# Patient Record
Sex: FEMALE | Race: WHITE | NOT HISPANIC OR LATINO | Employment: FULL TIME | URBAN - METROPOLITAN AREA
[De-identification: names, ages, dates, MRNs, and addresses within clinical notes are randomized per-mention and may not be internally consistent; named-entity substitution may affect disease eponyms.]

---

## 2021-03-26 ENCOUNTER — TELEPHONE (OUTPATIENT)
Dept: ADDICTION MEDICINE | Facility: CLINIC | Age: 61
End: 2021-03-26

## 2021-03-26 ENCOUNTER — OFFICE VISIT (OUTPATIENT)
Dept: INTERNAL MEDICINE | Facility: CLINIC | Age: 61
End: 2021-03-26
Payer: COMMERCIAL

## 2021-03-26 VITALS
HEIGHT: 63 IN | TEMPERATURE: 98.2 F | HEART RATE: 80 BPM | WEIGHT: 143 LBS | RESPIRATION RATE: 12 BRPM | DIASTOLIC BLOOD PRESSURE: 82 MMHG | OXYGEN SATURATION: 97 % | BODY MASS INDEX: 25.34 KG/M2 | SYSTOLIC BLOOD PRESSURE: 124 MMHG

## 2021-03-26 DIAGNOSIS — R71.8 ELEVATED MCV: ICD-10-CM

## 2021-03-26 DIAGNOSIS — Z78.9 ALCOHOL USE: ICD-10-CM

## 2021-03-26 DIAGNOSIS — Z12.83 SKIN CANCER SCREENING: ICD-10-CM

## 2021-03-26 DIAGNOSIS — F41.1 GAD (GENERALIZED ANXIETY DISORDER): ICD-10-CM

## 2021-03-26 DIAGNOSIS — R14.0 ABDOMINAL DISTENSION: ICD-10-CM

## 2021-03-26 DIAGNOSIS — D22.9 ATYPICAL MOLE: ICD-10-CM

## 2021-03-26 DIAGNOSIS — F32.0 MILD MAJOR DEPRESSION (H): ICD-10-CM

## 2021-03-26 DIAGNOSIS — N89.8 VAGINAL MASS: ICD-10-CM

## 2021-03-26 DIAGNOSIS — Z00.00 ROUTINE GENERAL MEDICAL EXAMINATION AT A HEALTH CARE FACILITY: Primary | ICD-10-CM

## 2021-03-26 LAB
ALBUMIN SERPL-MCNC: 3.7 G/DL (ref 3.4–5)
ALP SERPL-CCNC: 61 U/L (ref 40–150)
ALT SERPL W P-5'-P-CCNC: 27 U/L (ref 0–50)
ANION GAP SERPL CALCULATED.3IONS-SCNC: 7 MMOL/L (ref 3–14)
AST SERPL W P-5'-P-CCNC: 17 U/L (ref 0–45)
BASOPHILS # BLD AUTO: 0 10E9/L (ref 0–0.2)
BASOPHILS NFR BLD AUTO: 0.8 %
BILIRUB SERPL-MCNC: 0.3 MG/DL (ref 0.2–1.3)
BUN SERPL-MCNC: 14 MG/DL (ref 7–30)
CALCIUM SERPL-MCNC: 9.4 MG/DL (ref 8.5–10.1)
CHLORIDE SERPL-SCNC: 107 MMOL/L (ref 94–109)
CHOLEST SERPL-MCNC: 234 MG/DL
CO2 SERPL-SCNC: 26 MMOL/L (ref 20–32)
CREAT SERPL-MCNC: 0.68 MG/DL (ref 0.52–1.04)
DIFFERENTIAL METHOD BLD: ABNORMAL
EOSINOPHIL # BLD AUTO: 0.1 10E9/L (ref 0–0.7)
EOSINOPHIL NFR BLD AUTO: 2.4 %
ERYTHROCYTE [DISTWIDTH] IN BLOOD BY AUTOMATED COUNT: 12.4 % (ref 10–15)
GFR SERPL CREATININE-BSD FRML MDRD: >90 ML/MIN/{1.73_M2}
GLUCOSE SERPL-MCNC: 101 MG/DL (ref 70–99)
HCT VFR BLD AUTO: 42.9 % (ref 35–47)
HDLC SERPL-MCNC: 67 MG/DL
HGB BLD-MCNC: 13.9 G/DL (ref 11.7–15.7)
LDLC SERPL CALC-MCNC: 144 MG/DL
LYMPHOCYTES # BLD AUTO: 0.9 10E9/L (ref 0.8–5.3)
LYMPHOCYTES NFR BLD AUTO: 18.3 %
MCH RBC QN AUTO: 34.2 PG (ref 26.5–33)
MCHC RBC AUTO-ENTMCNC: 32.4 G/DL (ref 31.5–36.5)
MCV RBC AUTO: 105 FL (ref 78–100)
MONOCYTES # BLD AUTO: 0.6 10E9/L (ref 0–1.3)
MONOCYTES NFR BLD AUTO: 11.1 %
NEUTROPHILS # BLD AUTO: 3.4 10E9/L (ref 1.6–8.3)
NEUTROPHILS NFR BLD AUTO: 67.4 %
NONHDLC SERPL-MCNC: 167 MG/DL
PLATELET # BLD AUTO: 279 10E9/L (ref 150–450)
POTASSIUM SERPL-SCNC: 4.4 MMOL/L (ref 3.4–5.3)
PROT SERPL-MCNC: 7.9 G/DL (ref 6.8–8.8)
RBC # BLD AUTO: 4.07 10E12/L (ref 3.8–5.2)
SODIUM SERPL-SCNC: 140 MMOL/L (ref 133–144)
TRIGL SERPL-MCNC: 114 MG/DL
TSH SERPL DL<=0.005 MIU/L-ACNC: 0.79 MU/L (ref 0.4–4)
WBC # BLD AUTO: 5 10E9/L (ref 4–11)

## 2021-03-26 PROCEDURE — 99386 PREV VISIT NEW AGE 40-64: CPT | Performed by: INTERNAL MEDICINE

## 2021-03-26 PROCEDURE — 80050 GENERAL HEALTH PANEL: CPT | Performed by: INTERNAL MEDICINE

## 2021-03-26 PROCEDURE — 80061 LIPID PANEL: CPT | Performed by: INTERNAL MEDICINE

## 2021-03-26 PROCEDURE — 99214 OFFICE O/P EST MOD 30 MIN: CPT | Mod: 25 | Performed by: INTERNAL MEDICINE

## 2021-03-26 PROCEDURE — 96127 BRIEF EMOTIONAL/BEHAV ASSMT: CPT | Mod: 59 | Performed by: INTERNAL MEDICINE

## 2021-03-26 PROCEDURE — 36415 COLL VENOUS BLD VENIPUNCTURE: CPT | Performed by: INTERNAL MEDICINE

## 2021-03-26 PROCEDURE — G0145 SCR C/V CYTO,THINLAYER,RESCR: HCPCS | Performed by: INTERNAL MEDICINE

## 2021-03-26 PROCEDURE — 87624 HPV HI-RISK TYP POOLED RSLT: CPT | Performed by: INTERNAL MEDICINE

## 2021-03-26 PROCEDURE — 96127 BRIEF EMOTIONAL/BEHAV ASSMT: CPT | Performed by: INTERNAL MEDICINE

## 2021-03-26 ASSESSMENT — ANXIETY QUESTIONNAIRES
1. FEELING NERVOUS, ANXIOUS, OR ON EDGE: MORE THAN HALF THE DAYS
7. FEELING AFRAID AS IF SOMETHING AWFUL MIGHT HAPPEN: MORE THAN HALF THE DAYS
5. BEING SO RESTLESS THAT IT IS HARD TO SIT STILL: NEARLY EVERY DAY
2. NOT BEING ABLE TO STOP OR CONTROL WORRYING: NEARLY EVERY DAY
6. BECOMING EASILY ANNOYED OR IRRITABLE: NEARLY EVERY DAY
GAD7 TOTAL SCORE: 18
IF YOU CHECKED OFF ANY PROBLEMS ON THIS QUESTIONNAIRE, HOW DIFFICULT HAVE THESE PROBLEMS MADE IT FOR YOU TO DO YOUR WORK, TAKE CARE OF THINGS AT HOME, OR GET ALONG WITH OTHER PEOPLE: NOT DIFFICULT AT ALL
3. WORRYING TOO MUCH ABOUT DIFFERENT THINGS: NEARLY EVERY DAY

## 2021-03-26 ASSESSMENT — ENCOUNTER SYMPTOMS
HEADACHES: 1
FEVER: 0
ABDOMINAL PAIN: 0
ARTHRALGIAS: 0
COUGH: 0
NAUSEA: 0
SORE THROAT: 0
HEARTBURN: 0
PALPITATIONS: 0
JOINT SWELLING: 0
HEMATURIA: 0
CHILLS: 0
BREAST MASS: 0
SHORTNESS OF BREATH: 0
CONSTIPATION: 0
NERVOUS/ANXIOUS: 1
PARESTHESIAS: 0
HEMATOCHEZIA: 0
WEAKNESS: 0
MYALGIAS: 0
DIARRHEA: 0
DYSURIA: 0
EYE PAIN: 0

## 2021-03-26 ASSESSMENT — PATIENT HEALTH QUESTIONNAIRE - PHQ9
SUM OF ALL RESPONSES TO PHQ QUESTIONS 1-9: 11
5. POOR APPETITE OR OVEREATING: MORE THAN HALF THE DAYS

## 2021-03-26 ASSESSMENT — MIFFLIN-ST. JEOR: SCORE: 1182.77

## 2021-03-26 NOTE — PROGRESS NOTES
SUBJECTIVE:   CC: Mary Hercules is an 61 year old woman who presents for preventive health visit.     {  Patient has been advised of split billing requirements and indicates understanding: Yes  Healthy Habits:     Getting at least 3 servings of Calcium per day:  Yes    Bi-annual eye exam:  Yes    Dental care twice a year:  Yes    Sleep apnea or symptoms of sleep apnea:  Daytime drowsiness    Diet:  Regular (no restrictions)    Frequency of exercise:  2-3 days/week    Duration of exercise:  15-30 minutes    PHQ-2 Total Score: 2    Additional concerns today:  Yes    She had had an increase in anxiety and depression.  No thoughts of self harm.   She reports issues with talking too much and sleeping enough.   She has had an increase in alcohol use.   She has a hard time stopping with a glass of wine or beer each day.   PHQ9 of 9.  JAYLIN of 21.     Today's PHQ-2 Score:   PHQ-2 ( 1999 Pfizer) 3/26/2021   Q1: Little interest or pleasure in doing things 1   Q2: Feeling down, depressed or hopeless 1   PHQ-2 Score 2   Q1: Little interest or pleasure in doing things Several days   Q2: Feeling down, depressed or hopeless Several days   PHQ-2 Score 2       Abuse: Current or Past (Physical, Sexual or Emotional) - No  Do you feel safe in your environment? Yes    Have you ever done Advance Care Planning? (For example, a Health Directive, POLST, or a discussion with a medical provider or your loved ones about your wishes): Yes, patient states has an Advance Care Planning document and will bring a copy to the clinic.    Social History     Tobacco Use     Smoking status: Never Smoker     Smokeless tobacco: Never Used   Substance Use Topics     Alcohol use: Yes     Comment: occasionally     If you drink alcohol do you typically have >3 drinks per day or >7 drinks per week? NO  Alcohol Use 3/26/2021   Prescreen: >3 drinks/day or >7 drinks/week? No   Prescreen: >3 drinks/day or >7 drinks/week? -     She has 1-2 drinks every day.      Reviewed orders with patient.  Reviewed health maintenance and updated orders accordingly - Yes      Breast Cancer Screening:  Any new diagnosis of family breast, ovarian, or bowel cancer? No    FSH-7: No flowsheet data found.  click delete button to remove this line now  Mammogram Screening: Recommended mammography every 1-2 years with patient discussion and risk factor consideration  Pertinent mammograms are reviewed under the imaging tab.    History of abnormal Pap smear: NO - age 30- 65 PAP every 3 years recommended  PAP / HPV 2/12/2016 2/23/2010   PAP OTHER-NIL, See Result NIL     Reviewed and updated as needed this visit by clinical staff  Tobacco  Allergies  Meds   Med Hx  Surg Hx  Fam Hx  Soc Hx        Reviewed and updated as needed this visit by Provider                    Review of Systems   Constitutional: Negative for chills and fever.   HENT: Negative for congestion, ear pain, hearing loss and sore throat.    Eyes: Negative for pain and visual disturbance.   Respiratory: Negative for cough and shortness of breath.    Cardiovascular: Negative for chest pain, palpitations and peripheral edema.   Gastrointestinal: Negative for abdominal pain, constipation, diarrhea, heartburn, hematochezia and nausea.   Breasts:  Negative for tenderness, breast mass and discharge.   Genitourinary: Negative for dysuria, genital sores, hematuria, pelvic pain, urgency, vaginal bleeding and vaginal discharge.   Musculoskeletal: Negative for arthralgias, joint swelling and myalgias.   Skin: Negative for rash.   Neurological: Positive for headaches. Negative for weakness and paresthesias.   Psychiatric/Behavioral: Positive for mood changes. The patient is nervous/anxious.      Headaches.    She reports that she feels like she is retaining fluids.  She has not had swelling in her ankles.  She thinks she has had swelling in the abdomen.    She reports that she has varicose veins. They are not painful or uncomfortable.    "    OBJECTIVE:   /82   Pulse 80   Temp 98.2  F (36.8  C) (Oral)   Resp 12   Ht 1.6 m (5' 3\")   Wt 64.9 kg (143 lb)   LMP 01/01/2015 (Approximate)   SpO2 97%   Breastfeeding No   BMI 25.33 kg/m    Physical Exam  GENERAL APPEARANCE: healthy, alert and no distress  EYES: Eyes grossly normal to inspection, PERRL and conjunctivae and sclerae normal  HENT: ear canals and TM's normal, nose and mouth without ulcers or lesions, oropharynx clear and oral mucous membranes moist  NECK: no adenopathy, no asymmetry, masses, or scars and thyroid normal to palpation  RESP: lungs clear to auscultation - no rales, rhonchi or wheezes  BREAST: normal without masses, tenderness or nipple discharge and no palpable axillary masses or adenopathy  CV: regular rate and rhythm, normal S1 S2, no S3 or S4, no murmur, click or rub, no peripheral edema and peripheral pulses strong  ABDOMEN: soft, nontender, no hepatosplenomegaly, no masses and bowel sounds normal  Pelvic exam: normal vulva, vagina with a bulging at the opening on patient's right side; normal cervix without lesions or tenderness, uterus normal size anteverted, adenxa normal in size without tenderness, pap smear done  MS: no musculoskeletal defects are noted and gait is age appropriate without ataxia  SKIN: no suspicious lesions or rashes; left nipple and right let with atypical mole appreciated  NEURO: Normal strength and tone, sensory exam grossly normal, mentation intact and speech normal  PSYCH: mentation appears normal and affect normal/bright    Diagnostic Test Results:  Labs reviewed in Epic    ASSESSMENT/PLAN:       ICD-10-CM    1. Routine general medical examination at a health care facility  Z00.00 *MA Screening Digital Bilateral     Pap imaged thin layer screen with HPV - recommended age 30 - 65 years (select HPV order below)     HPV High Risk Types DNA Cervical     GASTROENTEROLOGY ADULT REF PROCEDURE ONLY     Comprehensive metabolic panel (BMP + Alb, Alk " "Phos, ALT, AST, Total. Bili, TP)     Lipid panel reflex to direct LDL Fasting     CBC with platelets and differential     TSH with free T4 reflex   2. Abdominal distension  R14.0 US Abdomen Complete   3. Mild major depression (H)  F32.0 sertraline (ZOLOFT) 50 MG tablet     DISCONTINUED: sertraline (ZOLOFT) 50 MG tablet   4. JAYLIN (generalized anxiety disorder)  F41.1 sertraline (ZOLOFT) 50 MG tablet     DISCONTINUED: sertraline (ZOLOFT) 50 MG tablet   5. Alcohol use  Z72.89 MENTAL HEALTH REFERRAL  - Adult; Addiction Medicine Provider; Addiction Medicine Evaluation & Treatment; Addiction Medicine Consultation, Evaluation & Treatment [phone]; Alcohol; Medication Assisted Treatment: Alcohol; We will contact you t...   6. Vaginal mass  N89.8 OB/GYN REFERRAL   7. Atypical mole  D22.9 DERMATOLOGY ADULT REFERRAL   8. Skin cancer screening  Z12.83 DERMATOLOGY ADULT REFERRAL     Depression and anxiety.  Not at goal.  Recommend counseling.  Patient was started on zoloft. Follow up in 1 month    Patient recommend to see chemical dependency. Counseled on alcohol cessation.    Vaginal bulging/mass.  Gynecology referral    Patient has been advised of split billing requirements and indicates understanding: Yes  COUNSELING:  Reviewed preventive health counseling, as reflected in patient instructions       Regular exercise       Healthy diet/nutrition    Estimated body mass index is 25.33 kg/m  as calculated from the following:    Height as of this encounter: 1.6 m (5' 3\").    Weight as of this encounter: 64.9 kg (143 lb).        She reports that she has never smoked. She has never used smokeless tobacco.      Counseling Resources:  ATP IV Guidelines  Pooled Cohorts Equation Calculator  Breast Cancer Risk Calculator  BRCA-Related Cancer Risk Assessment: FHS-7 Tool  FRAX Risk Assessment  ICSI Preventive Guidelines  Dietary Guidelines for Americans, 2010  USDA's MyPlate  ASA Prophylaxis  Lung CA Screening    Mavis Portillo, " MD  [facility]

## 2021-03-26 NOTE — TELEPHONE ENCOUNTER
"Please schedule appointment for patient with   Addiction Medicine Provider     For alcohol use disorder    Appt type: patient preference    Pt can call [phone] anytime to schedule.     Our scheduling staff will offer the following information:   Please invite our patient to call [place]'s assessment line - [phone]. They can ask for a \"substance use assessment\" or \"rule 25\". This will allow them to discuss possible psychosocial treatment options including individual therapy or any group options including outpatient, intensive outpatient, or residential (aka inpatient) treatment.     Que Patten MD    "

## 2021-03-27 ASSESSMENT — ANXIETY QUESTIONNAIRES: GAD7 TOTAL SCORE: 18

## 2021-03-30 LAB
COPATH REPORT: NORMAL
PAP: NORMAL

## 2021-03-31 LAB
FINAL DIAGNOSIS: NORMAL
HPV HR 12 DNA CVX QL NAA+PROBE: NEGATIVE
HPV16 DNA SPEC QL NAA+PROBE: NEGATIVE
HPV18 DNA SPEC QL NAA+PROBE: NEGATIVE
SPECIMEN DESCRIPTION: NORMAL
SPECIMEN SOURCE CVX/VAG CYTO: NORMAL

## 2021-04-01 ENCOUNTER — HOSPITAL ENCOUNTER (OUTPATIENT)
Dept: MAMMOGRAPHY | Facility: CLINIC | Age: 61
Discharge: HOME | End: 2021-04-01
Attending: INTERNAL MEDICINE | Admitting: INTERNAL MEDICINE
Payer: COMMERCIAL

## 2021-04-01 DIAGNOSIS — Z00.00 ROUTINE GENERAL MEDICAL EXAMINATION AT A HEALTH CARE FACILITY: ICD-10-CM

## 2021-04-01 PROCEDURE — 77063 BREAST TOMOSYNTHESIS BI: CPT

## 2021-06-20 DIAGNOSIS — F32.0 MILD MAJOR DEPRESSION (H): ICD-10-CM

## 2021-06-20 DIAGNOSIS — F41.1 GAD (GENERALIZED ANXIETY DISORDER): ICD-10-CM

## 2021-06-23 NOTE — TELEPHONE ENCOUNTER
Routing refill request to provider for review/approval because:  Due to PHQ score    Audra Mae, RN

## 2021-09-04 ENCOUNTER — HEALTH MAINTENANCE LETTER (OUTPATIENT)
Age: 61
End: 2021-09-04

## 2022-06-11 ENCOUNTER — HEALTH MAINTENANCE LETTER (OUTPATIENT)
Age: 62
End: 2022-06-11

## 2022-10-16 ENCOUNTER — HEALTH MAINTENANCE LETTER (OUTPATIENT)
Age: 62
End: 2022-10-16

## 2023-01-27 ENCOUNTER — LAB (OUTPATIENT)
Dept: PEDIATRICS | Facility: CLINIC | Age: 63
End: 2023-01-27

## 2023-01-27 ENCOUNTER — OFFICE VISIT (OUTPATIENT)
Dept: PEDIATRICS | Facility: CLINIC | Age: 63
End: 2023-01-27
Payer: COMMERCIAL

## 2023-01-27 VITALS
HEIGHT: 61 IN | HEART RATE: 85 BPM | TEMPERATURE: 97.2 F | BODY MASS INDEX: 26.45 KG/M2 | SYSTOLIC BLOOD PRESSURE: 98 MMHG | DIASTOLIC BLOOD PRESSURE: 68 MMHG | WEIGHT: 140.1 LBS | RESPIRATION RATE: 18 BRPM | OXYGEN SATURATION: 97 %

## 2023-01-27 DIAGNOSIS — D75.89 MACROCYTOSIS: ICD-10-CM

## 2023-01-27 DIAGNOSIS — Z00.00 ROUTINE GENERAL MEDICAL EXAMINATION AT A HEALTH CARE FACILITY: Primary | ICD-10-CM

## 2023-01-27 DIAGNOSIS — R73.01 IMPAIRED FASTING GLUCOSE: ICD-10-CM

## 2023-01-27 DIAGNOSIS — Z12.11 SCREEN FOR COLON CANCER: ICD-10-CM

## 2023-01-27 DIAGNOSIS — Z12.31 VISIT FOR SCREENING MAMMOGRAM: ICD-10-CM

## 2023-01-27 LAB
ERYTHROCYTE [DISTWIDTH] IN BLOOD BY AUTOMATED COUNT: 12.5 % (ref 10–15)
HBA1C MFR BLD: 6.2 % (ref 0–5.6)
HCT VFR BLD AUTO: 42.4 % (ref 35–47)
HGB BLD-MCNC: 13.9 G/DL (ref 11.7–15.7)
MCH RBC QN AUTO: 33.1 PG (ref 26.5–33)
MCHC RBC AUTO-ENTMCNC: 32.8 G/DL (ref 31.5–36.5)
MCV RBC AUTO: 101 FL (ref 78–100)
PLATELET # BLD AUTO: 309 10E3/UL (ref 150–450)
RBC # BLD AUTO: 4.2 10E6/UL (ref 3.8–5.2)
WBC # BLD AUTO: 6.7 10E3/UL (ref 4–11)

## 2023-01-27 PROCEDURE — 90715 TDAP VACCINE 7 YRS/> IM: CPT | Performed by: INTERNAL MEDICINE

## 2023-01-27 PROCEDURE — 83036 HEMOGLOBIN GLYCOSYLATED A1C: CPT | Performed by: INTERNAL MEDICINE

## 2023-01-27 PROCEDURE — 99396 PREV VISIT EST AGE 40-64: CPT | Mod: 25 | Performed by: INTERNAL MEDICINE

## 2023-01-27 PROCEDURE — 82607 VITAMIN B-12: CPT | Performed by: INTERNAL MEDICINE

## 2023-01-27 PROCEDURE — 36415 COLL VENOUS BLD VENIPUNCTURE: CPT | Performed by: INTERNAL MEDICINE

## 2023-01-27 PROCEDURE — 85027 COMPLETE CBC AUTOMATED: CPT | Performed by: INTERNAL MEDICINE

## 2023-01-27 PROCEDURE — 90471 IMMUNIZATION ADMIN: CPT | Performed by: INTERNAL MEDICINE

## 2023-01-27 SDOH — ECONOMIC STABILITY: INCOME INSECURITY: HOW HARD IS IT FOR YOU TO PAY FOR THE VERY BASICS LIKE FOOD, HOUSING, MEDICAL CARE, AND HEATING?: NOT HARD AT ALL

## 2023-01-27 SDOH — HEALTH STABILITY: PHYSICAL HEALTH: ON AVERAGE, HOW MANY MINUTES DO YOU ENGAGE IN EXERCISE AT THIS LEVEL?: 30 MIN

## 2023-01-27 SDOH — ECONOMIC STABILITY: TRANSPORTATION INSECURITY
IN THE PAST 12 MONTHS, HAS LACK OF TRANSPORTATION KEPT YOU FROM MEETINGS, WORK, OR FROM GETTING THINGS NEEDED FOR DAILY LIVING?: NO

## 2023-01-27 SDOH — ECONOMIC STABILITY: FOOD INSECURITY: WITHIN THE PAST 12 MONTHS, YOU WORRIED THAT YOUR FOOD WOULD RUN OUT BEFORE YOU GOT MONEY TO BUY MORE.: NEVER TRUE

## 2023-01-27 SDOH — ECONOMIC STABILITY: TRANSPORTATION INSECURITY
IN THE PAST 12 MONTHS, HAS THE LACK OF TRANSPORTATION KEPT YOU FROM MEDICAL APPOINTMENTS OR FROM GETTING MEDICATIONS?: NO

## 2023-01-27 SDOH — ECONOMIC STABILITY: INCOME INSECURITY: IN THE LAST 12 MONTHS, WAS THERE A TIME WHEN YOU WERE NOT ABLE TO PAY THE MORTGAGE OR RENT ON TIME?: NO

## 2023-01-27 SDOH — ECONOMIC STABILITY: FOOD INSECURITY: WITHIN THE PAST 12 MONTHS, THE FOOD YOU BOUGHT JUST DIDN'T LAST AND YOU DIDN'T HAVE MONEY TO GET MORE.: NEVER TRUE

## 2023-01-27 SDOH — HEALTH STABILITY: PHYSICAL HEALTH: ON AVERAGE, HOW MANY DAYS PER WEEK DO YOU ENGAGE IN MODERATE TO STRENUOUS EXERCISE (LIKE A BRISK WALK)?: 3 DAYS

## 2023-01-27 ASSESSMENT — LIFESTYLE VARIABLES
HOW MANY STANDARD DRINKS CONTAINING ALCOHOL DO YOU HAVE ON A TYPICAL DAY: 5 OR 6
SKIP TO QUESTIONS 9-10: 0
HOW OFTEN DO YOU HAVE A DRINK CONTAINING ALCOHOL: 2-3 TIMES A WEEK
HOW OFTEN DO YOU HAVE SIX OR MORE DRINKS ON ONE OCCASION: MONTHLY
AUDIT-C TOTAL SCORE: 7

## 2023-01-27 ASSESSMENT — ENCOUNTER SYMPTOMS
DIZZINESS: 0
EYE PAIN: 0
SORE THROAT: 0
MYALGIAS: 0
HEADACHES: 0
PALPITATIONS: 0
DYSURIA: 0
BREAST MASS: 0
FEVER: 0
COUGH: 0
ARTHRALGIAS: 0
HEMATURIA: 0
HEMATOCHEZIA: 0
ABDOMINAL PAIN: 0
DIARRHEA: 1
FREQUENCY: 0
JOINT SWELLING: 0
HEARTBURN: 0
WEAKNESS: 0
NAUSEA: 0
NERVOUS/ANXIOUS: 1
CONSTIPATION: 1
PARESTHESIAS: 0
SHORTNESS OF BREATH: 0
CHILLS: 0

## 2023-01-27 ASSESSMENT — PATIENT HEALTH QUESTIONNAIRE - PHQ9
SUM OF ALL RESPONSES TO PHQ QUESTIONS 1-9: 6
10. IF YOU CHECKED OFF ANY PROBLEMS, HOW DIFFICULT HAVE THESE PROBLEMS MADE IT FOR YOU TO DO YOUR WORK, TAKE CARE OF THINGS AT HOME, OR GET ALONG WITH OTHER PEOPLE: NOT DIFFICULT AT ALL
SUM OF ALL RESPONSES TO PHQ QUESTIONS 1-9: 6

## 2023-01-27 ASSESSMENT — PAIN SCALES - GENERAL: PAINLEVEL: NO PAIN (0)

## 2023-01-27 ASSESSMENT — SOCIAL DETERMINANTS OF HEALTH (SDOH)
HOW OFTEN DO YOU GET TOGETHER WITH FRIENDS OR RELATIVES?: THREE TIMES A WEEK
HOW OFTEN DO YOU ATTEND CHURCH OR RELIGIOUS SERVICES?: 1 TO 4 TIMES PER YEAR
IN A TYPICAL WEEK, HOW MANY TIMES DO YOU TALK ON THE PHONE WITH FAMILY, FRIENDS, OR NEIGHBORS?: MORE THAN THREE TIMES A WEEK
DO YOU BELONG TO ANY CLUBS OR ORGANIZATIONS SUCH AS CHURCH GROUPS UNIONS, FRATERNAL OR ATHLETIC GROUPS, OR SCHOOL GROUPS?: NO

## 2023-01-27 NOTE — PROGRESS NOTES
SUBJECTIVE:   CC: Mary is an 63 year old who presents for preventive health visit.   Patient has been advised of split billing requirements and indicates understanding: Yes  Healthy Habits:     Getting at least 3 servings of Calcium per day:  Yes    Bi-annual eye exam:  Yes    Dental care twice a year:  Yes    Sleep apnea or symptoms of sleep apnea:  None    Diet:  Regular (no restrictions)    Frequency of exercise:  2-3 days/week    Duration of exercise:  15-30 minutes    Taking medications regularly:  Yes    Medication side effects:  Not applicable    PHQ-2 Total Score: 1    Additional concerns today:  Yes  exercise - goes to anytime fitness classes and walking  The 10-year ASCVD risk score (Kenny LOYOLA, et al., 2019) is: 2.7%    Values used to calculate the score:      Age: 63 years      Sex: Female      Is Non- : No      Diabetic: No      Tobacco smoker: No      Systolic Blood Pressure: 98 mmHg      Is BP treated: No      HDL Cholesterol: 67 mg/dL      Total Cholesterol: 234 mg/dL   Gets chest pain that radiates to neck that lasts about 10-30 secs.  Mostly at rest.  Not with exercise.    Mood has been fine    Today's PHQ-2 Score:   PHQ-2 ( 1999 Pfizer) 1/27/2023   Q1: Little interest or pleasure in doing things 0   Q2: Feeling down, depressed or hopeless 1   PHQ-2 Score 1   PHQ-2 Total Score (12-17 Years)- Positive if 3 or more points; Administer PHQ-A if positive -   Q1: Little interest or pleasure in doing things Not at all   Q2: Feeling down, depressed or hopeless Several days   PHQ-2 Score 1           Social History     Tobacco Use     Smoking status: Never     Smokeless tobacco: Never   Substance Use Topics     Alcohol use: Yes     Comment: occasionally     If you drink alcohol do you typically have >3 drinks per day or >7 drinks per week? No    Alcohol Use 1/27/2023   Prescreen: >3 drinks/day or >7 drinks/week? No   Prescreen: >3 drinks/day or >7 drinks/week? -       Reviewed  orders with patient.  Reviewed health maintenance and updated orders accordingly - Yes  Labs reviewed in EPIC    Breast Cancer Screening:    Breast CA Risk Assessment (FHS-7) 1/27/2023   Do you have a family history of breast, colon, or ovarian cancer? No / Unknown       Mammogram Screening: Recommended mammography every 1-2 years with patient discussion and risk factor consideration  Pertinent mammograms are reviewed under the imaging tab.    History of abnormal Pap smear: NO - age 30- 65 PAP every 3 years recommended  PAP / HPV Latest Ref Rng & Units 3/26/2021 2/12/2016 2/23/2010   PAP (Historical) - NIL OTHER-NIL, See Result NIL   HPV16 NEG:Negative Negative - -   HPV18 NEG:Negative Negative - -   HRHPV NEG:Negative Negative - -     Reviewed and updated as needed this visit by clinical staff   Tobacco  Allergies  Meds              Reviewed and updated as needed this visit by Provider                     Review of Systems   Constitutional: Negative for chills and fever.   HENT: Negative for congestion, ear pain, hearing loss and sore throat.    Eyes: Negative for pain and visual disturbance.   Respiratory: Negative for cough and shortness of breath.    Cardiovascular: Positive for chest pain. Negative for palpitations and peripheral edema.   Gastrointestinal: Positive for constipation and diarrhea. Negative for abdominal pain, heartburn, hematochezia and nausea.   Breasts:  Negative for tenderness, breast mass and discharge.   Genitourinary: Negative for dysuria, frequency, genital sores, hematuria, pelvic pain, urgency, vaginal bleeding and vaginal discharge.   Musculoskeletal: Negative for arthralgias, joint swelling and myalgias.   Skin: Negative for rash.   Neurological: Negative for dizziness, weakness, headaches and paresthesias.   Psychiatric/Behavioral: Positive for mood changes. The patient is nervous/anxious.    constipation/diarrhea - occasionally one or the other.  Sometimes bad stomach  "cramps.  States mood is doing fine.       OBJECTIVE:   BP 98/68 (BP Location: Right arm, Patient Position: Sitting, Cuff Size: Adult Regular)   Pulse 85   Temp 97.2  F (36.2  C) (Tympanic)   Resp 18   Ht 1.555 m (5' 1.22\")   Wt 63.5 kg (140 lb 1.6 oz)   LMP 01/01/2015 (Approximate)   SpO2 97%   BMI 26.28 kg/m    Physical Exam  GENERAL: healthy, alert and no distress  EYES: Eyes grossly normal to inspection, PERRL and conjunctivae and sclerae normal  HENT: ear canals and TM's normal, nose and mouth without ulcers or lesions  NECK: no adenopathy, no asymmetry, masses, or scars and thyroid normal to palpation  RESP: lungs clear to auscultation - no rales, rhonchi or wheezes  CV: regular rate and rhythm, normal S1 S2, no S3 or S4, no murmur, click or rub, no peripheral edema and peripheral pulses strong  ABDOMEN: soft, nontender, no hepatosplenomegaly, no masses and bowel sounds normal  MS: no gross musculoskeletal defects noted, no edema  SKIN: no suspicious lesions or rashes  NEURO: Normal strength and tone, mentation intact and speech normal  PSYCH: mentation appears normal, affect normal/bright    Diagnostic Test Results:  Labs reviewed in Epic    ASSESSMENT/PLAN:   Routine general medical examination at a health care facility  Routine health education discussed: calcium, diet, exercise, weight, safety.     Impaired fasting glucose  Encouraged healthy diet and exercise, recheck  - Hemoglobin A1c; Future  - Hemoglobin A1c    Macrocytosis  recheck  - CBC with platelets; Future  - Vitamin B12; Future  - CBC with platelets  - Vitamin B12    Visit for screening mammogram    - MA Screen Bilateral w/Hieu; Future    Screen for colon cancer  Overdue.  Willing to do cologuard  - COLOGUARD([employer]); Future              COUNSELING:  Reviewed preventive health counseling, as reflected in patient instructions      BMI:   Estimated body mass index is 26.28 kg/m  as calculated from the following:    Height as of this " "encounter: 1.555 m (5' 1.22\").    Weight as of this encounter: 63.5 kg (140 lb 1.6 oz).   Weight management plan: Discussed healthy diet and exercise guidelines      She reports that she has never smoked. She has never used smokeless tobacco.          Hilaria Montez MD  [facility] ALESSANDRA  Answers for HPI/ROS submitted by the patient on 1/27/2023  If you checked off any problems, how difficult have these problems made it for you to do your work, take care of things at home, or get along with other people?: Not difficult at all  PHQ9 TOTAL SCORE: 6      "

## 2023-01-27 NOTE — PATIENT INSTRUCTIONS
If you get a painful rash, please call us right away to talk about whether it is shingles and start treatment.    If you get covid, please call us right away to talk about treatment.    Try to keep a diet diary to see what is triggering the intestinal issues.  I would recommend starting a probiotic with 10-30 billion colony forming units of 5 or more types of bacteria daily to see if that helps.      Preventive Health Recommendations  Female Ages 50 - 64    Yearly exam: See your health care provider every year in order to  Review health changes.   Discuss preventive care.    Review your medicines if your doctor has prescribed any.    Get a Pap test every three years (unless you have an abnormal result and your provider advises testing more often).  You are due next year.    Have a mammogram - you are due in April  Have a cologuard test - they will mail it to you  Have a cholesterol test every 5 years, or more often if advised.  Have a diabetes test (fasting glucose) every three years. If you are at risk for diabetes, you should have this test more often.   If you are at risk for osteoporosis (brittle bone disease), think about having a bone density scan (DEXA).    Shots: Get a flu shot each year. Get a tetanus shot every 10 years.    Nutrition:   Eat at least 5 servings of fruits and vegetables each day.  Eat whole-grain bread, whole-wheat pasta and brown rice instead of white grains and rice.  Get adequate Calcium and Vitamin D.     Lifestyle  Exercise at least 150 minutes a week (30 minutes a day, 5 days a week). This will help you control your weight and prevent disease.  Limit alcohol to one drink per day.  No smoking.   Wear sunscreen to prevent skin cancer.   See your dentist every six months for an exam and cleaning.  See your eye doctor every 1 to 2 years.

## 2023-01-28 LAB — VIT B12 SERPL-MCNC: 556 PG/ML (ref 232–1245)

## 2023-02-16 LAB — NONINV COLON CA DNA+OCC BLD SCRN STL QL: NEGATIVE

## 2023-06-17 ENCOUNTER — HEALTH MAINTENANCE LETTER (OUTPATIENT)
Age: 63
End: 2023-06-17

## 2023-11-13 ENCOUNTER — HOSPITAL ENCOUNTER (OUTPATIENT)
Dept: MAMMOGRAPHY | Facility: CLINIC | Age: 63
Discharge: HOME | End: 2023-11-13
Attending: INTERNAL MEDICINE | Admitting: INTERNAL MEDICINE
Payer: COMMERCIAL

## 2023-11-13 DIAGNOSIS — Z12.31 VISIT FOR SCREENING MAMMOGRAM: ICD-10-CM

## 2023-11-13 PROCEDURE — 77067 SCR MAMMO BI INCL CAD: CPT

## 2024-01-04 ENCOUNTER — PATIENT OUTREACH (OUTPATIENT)
Dept: CARE COORDINATION | Facility: CLINIC | Age: 64
End: 2024-01-04
Payer: COMMERCIAL

## 2024-01-18 ENCOUNTER — PATIENT OUTREACH (OUTPATIENT)
Dept: CARE COORDINATION | Facility: CLINIC | Age: 64
End: 2024-01-18
Payer: COMMERCIAL

## 2024-03-23 ENCOUNTER — HEALTH MAINTENANCE LETTER (OUTPATIENT)
Age: 64
End: 2024-03-23

## 2025-03-22 ENCOUNTER — HEALTH MAINTENANCE LETTER (OUTPATIENT)
Age: 65
End: 2025-03-22

## 2025-04-12 ENCOUNTER — HEALTH MAINTENANCE LETTER (OUTPATIENT)
Age: 65
End: 2025-04-12

## 2025-08-25 ENCOUNTER — PATIENT OUTREACH (OUTPATIENT)
Dept: CARE COORDINATION | Facility: CLINIC | Age: 65
End: 2025-08-25
Payer: COMMERCIAL